# Patient Record
Sex: FEMALE | Race: OTHER | HISPANIC OR LATINO | URBAN - METROPOLITAN AREA
[De-identification: names, ages, dates, MRNs, and addresses within clinical notes are randomized per-mention and may not be internally consistent; named-entity substitution may affect disease eponyms.]

---

## 2024-02-28 ENCOUNTER — OFFICE (OUTPATIENT)
Dept: URBAN - METROPOLITAN AREA CLINIC 19 | Facility: CLINIC | Age: 34
End: 2024-02-28

## 2024-02-28 VITALS
WEIGHT: 203 LBS | SYSTOLIC BLOOD PRESSURE: 128 MMHG | OXYGEN SATURATION: 98 % | DIASTOLIC BLOOD PRESSURE: 88 MMHG | HEIGHT: 64 IN | HEART RATE: 90 BPM

## 2024-02-28 DIAGNOSIS — K60.2 ANAL FISSURE, UNSPECIFIED: ICD-10-CM

## 2024-02-28 DIAGNOSIS — K92.1 MELENA: ICD-10-CM

## 2024-02-28 DIAGNOSIS — K21.9 GASTRO-ESOPHAGEAL REFLUX DISEASE WITHOUT ESOPHAGITIS: ICD-10-CM

## 2024-02-28 DIAGNOSIS — K59.00 CONSTIPATION, UNSPECIFIED: ICD-10-CM

## 2024-02-28 PROCEDURE — 99204 OFFICE O/P NEW MOD 45 MIN: CPT

## 2024-02-28 NOTE — SERVICEHPINOTES
33-year-old female is here for evaluation of rectal bleeding.  Reports bright red blood when she wipes and mixed in stool for the last month.  She has daily bowel movements with a sense of incomplete evacuation as well as hard stool and straining. Eating lots of fiber and drinks adequate water. Reports rectal pain with defecation. Takes Tums as needed which seems to control her acid reflux.  Does not drink any carbonated beverages or sodas.  Takes NSAIDs during her menstrual cycles.  Does not vape or any smoke marijuana.  Denies any abdominal pain.  Denies nausea or vomiting or dysphagia.  No family history of colon cancer, colon polyps, or IBD.  No cardiac issues, not taking any blood thinners or weight loss medications.  She has never had a colonoscopy.

## 2024-02-28 NOTE — SERVICENOTES
Linda cuenca MD Addendum: Ariella TORRES MD, independently interviewed and examined this patient and made modifications to the final HPI, exam, impression, and plan as initially scribed by Pamela Olmstead NP.

## 2024-04-29 ENCOUNTER — OFFICE (OUTPATIENT)
Dept: URBAN - METROPOLITAN AREA CLINIC 19 | Facility: CLINIC | Age: 34
End: 2024-04-29

## 2024-04-29 VITALS
WEIGHT: 203 LBS | HEART RATE: 93 BPM | SYSTOLIC BLOOD PRESSURE: 121 MMHG | HEIGHT: 64 IN | OXYGEN SATURATION: 97 % | DIASTOLIC BLOOD PRESSURE: 76 MMHG

## 2024-04-29 DIAGNOSIS — K92.1 MELENA: ICD-10-CM

## 2024-04-29 DIAGNOSIS — K21.9 GASTRO-ESOPHAGEAL REFLUX DISEASE WITHOUT ESOPHAGITIS: ICD-10-CM

## 2024-04-29 DIAGNOSIS — K60.2 ANAL FISSURE, UNSPECIFIED: ICD-10-CM

## 2024-04-29 DIAGNOSIS — K59.00 CONSTIPATION, UNSPECIFIED: ICD-10-CM

## 2024-04-29 PROCEDURE — 99213 OFFICE O/P EST LOW 20 MIN: CPT

## 2024-04-29 NOTE — SERVICEHPINOTES
33-year-old female is here for a follow up visit. Evaluated in February 2024 for rectal bleeding and constipation. CBC was normal at that time. Patient was treated for an anal fissure with diltiazem/lidocaine ointment.  Reports one episode of rectal pain with defecation 3 weeks ago. Denies anymore episodes of rectal bleeding. Taking MiraLax on a daily basis and reports her bowel movements are regular.  Her insurance did not cover Linzess. Takes Tums as needed which seems to control her acid reflux.  Does not drink any carbonated beverages or sodas. Rarely takes NSAIDs.  Does not vape or any smoke marijuana.  Denies any abdominal pain.  Denies nausea or vomiting or dysphagia. Weight appears stable. No family history of colon cancer, colon polyps, or IBD.  No cardiac issues, not taking any blood thinners or weight loss medications.  She has never had a colonoscopy.